# Patient Record
Sex: MALE | Race: WHITE | URBAN - METROPOLITAN AREA
[De-identification: names, ages, dates, MRNs, and addresses within clinical notes are randomized per-mention and may not be internally consistent; named-entity substitution may affect disease eponyms.]

---

## 2017-10-18 ENCOUNTER — OFFICE VISIT (OUTPATIENT)
Dept: FAMILY MEDICINE | Facility: CLINIC | Age: 37
End: 2017-10-18
Payer: COMMERCIAL

## 2017-10-18 DIAGNOSIS — H01.004 BLEPHARITIS OF UPPER EYELIDS OF BOTH EYES, UNSPECIFIED TYPE: ICD-10-CM

## 2017-10-18 DIAGNOSIS — H01.001 BLEPHARITIS OF UPPER EYELIDS OF BOTH EYES, UNSPECIFIED TYPE: ICD-10-CM

## 2017-10-18 DIAGNOSIS — I78.1 CAPILLARY HEMANGIOMA: ICD-10-CM

## 2017-10-18 DIAGNOSIS — L21.9 SEBORRHEIC DERMATITIS: Primary | ICD-10-CM

## 2017-10-18 PROCEDURE — 99214 OFFICE O/P EST MOD 30 MIN: CPT | Performed by: FAMILY MEDICINE

## 2017-10-18 RX ORDER — DOXYCYCLINE 50 MG/1
CAPSULE ORAL
Qty: 60 CAPSULE | Refills: 1 | Status: SHIPPED | OUTPATIENT
Start: 2017-10-18 | End: 2017-11-15

## 2017-10-18 RX ORDER — OMEPRAZOLE 40 MG/1
40 CAPSULE, DELAYED RELEASE ORAL
COMMUNITY
Start: 2015-08-15

## 2017-10-18 RX ORDER — KETOCONAZOLE 20 MG/G
CREAM TOPICAL 2 TIMES DAILY
Qty: 30 G | Refills: 1 | Status: SHIPPED | OUTPATIENT
Start: 2017-10-18

## 2017-10-18 NOTE — PROGRESS NOTES
"[facility] - PRIMARY CARE SKIN    CC : Lesion(s)  SUBJECTIVE:                                                    Steven Patricio is a 37 year old male who presents to clinic today because of changing lesions on the face and scalp.    Bothersome lesions noticed by the patient or other skin concerns :  Issue One : He has developed \"oily bumps\" on forehead, worse in the emanuel.  Onset : approximately 6 months.  Enlarging : NO.  Bleeding : NO  Itchy or irritating : YES.  Pain or tenderness : YES.  Changing color : NO.  Issue Two : Lesion on scalp began smaller and has been enlarging in size.  Onset : approximately 1 year.  Enlarging : YES.  Bleeding : NO  Itchy or irritating : NO.  Pain or tenderness : NO.  Changing color : YES.    Issue Three : He also requests advice for effective long-term control of blepharitis, which has been an issue for years. He has been using wipes throughout the day for control. J&J baby shampoo has also been tried in the past. He has not had any topical or oral antibiotics for control of blepharitis.    Personal history of skin cancer : NO.  Family history of skin cancer : YES - skin cancer in grandfather.    Sun Exposure History  Previous history of significant sun exposure:  Blistering sunburns : YES  Tanning beds : NO.  Sunscreen Use : YES.  Sun-protective clothing use : No  Wide-brimmed hats : No  Sunglasses : Yes  Seeks shade : No    Occupation : logistics (indoor).    Refer to electronic medical record (EMR) for past medical history and medications.    INTEGUMENTARY/SKIN: POSITIVE for changing lesions  ROS : 14 point review of systems was negative except the symptoms listed above in the HPI.    This document serves as a record of the services and decisions personally performed and made by Pam Galicia MD. It was created on her behalf by Giovani Hutton, a trained medical scribe.  The creation of this document is based on the scribe's personal observations and the provider's statements " to the medical scribe.  Giovani Hutton, October 18, 2017 3:42 PM      OBJECTIVE:                                                    GENERAL: healthy, alert and no distress  SKIN: Moser Skin Type - I.  Scalp, Face were examined. The dermatoscope was used to help evaluate pigmented lesions.  Skin Pertinent Findings:  Face : Light scaling and slight erythema across eyebrows.    Eyelids : crusting at base of upper eyelids and diffuse erythema on both upper and lower eyelids    Scalp : Scattered light scaling and dandruff.    Right parietal scalp : 3 mm in size erythematous lesion(s) consistent with capillary hemangioma.    Diagnostic Test Results:  none         ASSESSMENT:                                                      Encounter Diagnoses   Name Primary?     Seborrheic dermatitis Yes     Capillary hemangioma      Blepharitis of upper eyelids of both eyes, unspecified type          PLAN:                                                    Patient Instructions   FUTURE APPOINTMENTS  Follow up in 4 weeks for re-evaluation of blepharitis and seborrheic dermatitis.    TOPICAL MEDICATION INSTRUCTIONS  Ketoconazole 2% cream    Apply a thin layer to the affected area(s) two times per day.     Keep in mind to also regularly use moisturizer, as this preventative measure can help maintain your skin's natural moisture barrier.    Apply moisturizer after application of medication.    ORAL ANTIBIOTIC  Take by mouth 1 capsule/tablet of doxycycline 50 mg two time(s) a day for 4 weeks.        PROCEDURES:                                                    None.    TT : 25 minutes.  CT : 15 minutes.      The information in this document, created by the medical scribe for me, accurately reflects the services I personally performed and the decisions made by me. I have reviewed and approved this document for accuracy prior to leaving the patient care area.  Pam Galicia MD October 18, 2017 3:42 PM  [facility] PRIMARY CARE  SKIN

## 2017-10-18 NOTE — PATIENT INSTRUCTIONS
FUTURE APPOINTMENTS  Follow up in 4 weeks for re-evaluation of blepharitis and seborrheic dermatitis.    TOPICAL MEDICATION INSTRUCTIONS  Ketoconazole 2% cream    Apply a thin layer to the affected area(s) two times per day.     Keep in mind to also regularly use moisturizer, as this preventative measure can help maintain your skin's natural moisture barrier.    Apply moisturizer after application of medication.    ORAL ANTIBIOTIC  Take by mouth 1 capsule/tablet of doxycycline 50 mg two time(s) a day for 4 weeks.

## 2017-10-18 NOTE — MR AVS SNAPSHOT
After Visit Summary   10/18/2017    Steven Patricio    MRN: [id number]           Patient Information     Date Of Birth          1980        Visit Information        Provider Department      10/18/2017 3:40 PM Stephenie Galicia MD [facility] Primary Care Skin        Today's Diagnoses     Seborrheic dermatitis    -  1    Capillary hemangioma        Blepharitis of upper eyelids of both eyes, unspecified type          Care Instructions    FUTURE APPOINTMENTS  Follow up in 4 weeks for re-evaluation of blepharitis and seborrheic dermatitis.    TOPICAL MEDICATION INSTRUCTIONS  Ketoconazole 2% cream    Apply a thin layer to the affected area(s) two times per day.     Keep in mind to also regularly use moisturizer, as this preventative measure can help maintain your skin's natural moisture barrier.    Apply moisturizer after application of medication.    ORAL ANTIBIOTIC  Take by mouth 1 capsule/tablet of doxycycline 50 mg two time(s) a day for 4 weeks.          Follow-ups after your visit        Who to contact     If you have questions or need follow up information about today's clinic visit or your schedule please contact [facility] PRIMARY CARE SKIN directly at [phone].  Normal or non-critical lab and imaging results will be communicated to you by Arista Powerhart, letter or phone within 4 business days after the clinic has received the results. If you do not hear from us within 7 days, please contact the clinic through Arista Powerhart or phone. If you have a critical or abnormal lab result, we will notify you by phone as soon as possible.  Submit refill requests through [employer] or call your pharmacy and they will forward the refill request to us. Please allow 3 business days for your refill to be completed.          Additional Information About Your Visit        MyChar[facility] Information     [employer] lets you send messages to your doctor, view your test results, renew your prescriptions, schedule  "appointments and more. To sign up, go to [email or web address] . Click on \"Log in\" on the left side of the screen, which will take you to the Welcome page. Then click on \"Sign up Now\" on the right side of the page.     You will be asked to enter the access code listed below, as well as some personal information. Please follow the directions to create your username and password.     Your access code is: [email or web address]  Expires: 2018  3:54 PM     Your access code will  in 90 days. If you need help or a new code, please call your [facility] clinic or [phone].        Care EveryWhere ID     This is your Care EveryWhere ID. This could be used by other organizations to access your [facility] medical records  [id number]         Blood Pressure from Last 3 Encounters:   No data found for BP    Weight from Last 3 Encounters:   No data found for Wt              Today, you had the following     No orders found for display         Today's Medication Changes          These changes are accurate as of: 10/18/17  3:54 PM.  If you have any questions, ask your nurse or doctor.               Start taking these medicines.        Dose/Directions    doxycycline monohydrate 50 MG capsule   Used for:  Blepharitis of upper eyelids of both eyes, unspecified type   Started by:  Stephenie Galicia MD        50 mg po bid for 4 weeks   Quantity:  60 capsule   Refills:  1       ketoconazole 2 % cream   Commonly known as:  NIZORAL   Used for:  Seborrheic dermatitis, Capillary hemangioma   Started by:  Stephenie Galicia MD        Apply topically 2 times daily   Quantity:  30 g   Refills:  1            Where to get your medicines      These medications were sent to [facility] [id number] [place] [address] [address] [facility] [address]  [address] [place] [address] [address], [facility] [address]     Phone:  [phone]     doxycycline monohydrate 50 MG capsule    ketoconazole 2 % cream                Primary Care Provider " Fax #    Provider Not In System [phone]                Equal Access to Services     JYOTIJAYME TIFFANI : Hadii estelle Sanon, liza torres, burkerioc singhmanicolas storylucilamirza hager. So [facility] [phone].    ATENCIÓN: Si habla español, tiene a welch disposición servicios gratuitos de asistencia lingüística. Llame al [phone].    We comply with applicable federal civil rights laws and [place] laws. We do not discriminate on the basis of race, color, national origin, age, disability, sex, sexual orientation, or gender identity.            Thank you!     Thank you for choosing [facility] - PRIMARY CARE SKIN  for your care. Our goal is always to provide you with excellent care. Hearing back from our patients is one way we can continue to improve our services. Please take a few minutes to complete the written survey that you may receive in the mail after your visit with us. Thank you!             Your Updated Medication List - Protect others around you: Learn how to safely use, store and throw away your medicines at www.disposemymeds.org.          This list is accurate as of: 10/18/17  3:54 PM.  Always use your most recent med list.                   Brand Name Dispense Instructions for use Diagnosis    doxycycline monohydrate 50 MG capsule     60 capsule    50 mg po bid for 4 weeks    Blepharitis of upper eyelids of both eyes, unspecified type       ketoconazole 2 % cream    NIZORAL    30 g    Apply topically 2 times daily    Seborrheic dermatitis, Capillary hemangioma       omeprazole 40 MG capsule    priLOSEC     Take 40 mg by mouth

## 2017-10-18 NOTE — LETTER
"    10/18/2017         RE: Steven Patricio  [address]  [place] [address]        Dear Colleague,    Thank you for referring your patient, Steven Patricio, to the [facility] - PRIMARY CARE SKIN. Please see a copy of my visit note below.    [facility] PRIMARY CARE SKIN    CC : Lesion(s)  SUBJECTIVE:                                                    Steven Patricio is a 37 year old male who presents to clinic today because of changing lesions on the face and scalp.    Bothersome lesions noticed by the patient or other skin concerns :  Issue One : He has developed \"oily bumps\" on forehead, worse in the emanuel.  Onset : approximately 6 months.  Enlarging : NO.  Bleeding : NO  Itchy or irritating : YES.  Pain or tenderness : YES.  Changing color : NO.  Issue Two : Lesion on scalp began smaller and has been enlarging in size.  Onset : approximately 1 year.  Enlarging : YES.  Bleeding : NO  Itchy or irritating : NO.  Pain or tenderness : NO.  Changing color : YES.    Issue Three : He also requests advice for effective long-term control of blepharitis, which has been an issue for years. He has been using wipes throughout the day for control. J&J baby shampoo has also been tried in the past. He has not had any topical or oral antibiotics for control of blepharitis.    Personal history of skin cancer : NO.  Family history of skin cancer : YES - skin cancer in grandfather.    Sun Exposure History  Previous history of significant sun exposure:  Blistering sunburns : YES  Tanning beds : NO.  Sunscreen Use : YES.  Sun-protective clothing use : No  Wide-brimmed hats : No  Sunglasses : Yes  Seeks shade : No    Occupation : logistics (indoor).    Refer to electronic medical record (EMR) for past medical history and medications.    INTEGUMENTARY/SKIN: POSITIVE for changing lesions  ROS : 14 point review of systems was negative except the symptoms listed above in the HPI.    This document serves as a record of the services " and decisions personally performed and made by Pam Galicia MD. It was created on her behalf by Giovani Hutton, a trained medical scribe.  The creation of this document is based on the scribe's personal observations and the provider's statements to the medical scribe.  Giovani Hutton, October 18, 2017 3:42 PM      OBJECTIVE:                                                    GENERAL: healthy, alert and no distress  SKIN: Moser Skin Type - I.  Scalp, Face were examined. The dermatoscope was used to help evaluate pigmented lesions.  Skin Pertinent Findings:  Face : Light scaling and slight erythema across eyebrows.    Eyelids : crusting at base of upper eyelids and diffuse erythema on both upper and lower eyelids    Scalp : Scattered light scaling and dandruff.    Right parietal scalp : 3 mm in size erythematous lesion(s) consistent with capillary hemangioma.    Diagnostic Test Results:  none         ASSESSMENT:                                                      Encounter Diagnoses   Name Primary?     Seborrheic dermatitis Yes     Capillary hemangioma      Blepharitis of upper eyelids of both eyes, unspecified type          PLAN:                                                    Patient Instructions   FUTURE APPOINTMENTS  Follow up in 4 weeks for re-evaluation of blepharitis and seborrheic dermatitis.    TOPICAL MEDICATION INSTRUCTIONS  Ketoconazole 2% cream    Apply a thin layer to the affected area(s) two times per day.     Keep in mind to also regularly use moisturizer, as this preventative measure can help maintain your skin's natural moisture barrier.    Apply moisturizer after application of medication.    ORAL ANTIBIOTIC  Take by mouth 1 capsule/tablet of doxycycline 50 mg two time(s) a day for 4 weeks.        PROCEDURES:                                                    None.    TT : 25 minutes.  CT : 15 minutes.      The information in this document, created by the medical scribe for me, accurately  reflects the services I personally performed and the decisions made by me. I have reviewed and approved this document for accuracy prior to leaving the patient care area.  Pam Galicia MD October 18, 2017 3:42 PM  [facility] - PRIMARY CARE SKIN    Again, thank you for allowing me to participate in the care of your patient.        Sincerely,        Stephenie Galicia MD

## 2017-11-15 ENCOUNTER — OFFICE VISIT (OUTPATIENT)
Dept: FAMILY MEDICINE | Facility: CLINIC | Age: 37
End: 2017-11-15
Payer: COMMERCIAL

## 2017-11-15 DIAGNOSIS — L71.8 OCULAR ROSACEA: ICD-10-CM

## 2017-11-15 DIAGNOSIS — H01.004 BLEPHARITIS OF UPPER EYELIDS OF BOTH EYES, UNSPECIFIED TYPE: ICD-10-CM

## 2017-11-15 DIAGNOSIS — L21.9 SEBORRHEIC DERMATITIS: Primary | ICD-10-CM

## 2017-11-15 DIAGNOSIS — H01.001 BLEPHARITIS OF UPPER EYELIDS OF BOTH EYES, UNSPECIFIED TYPE: ICD-10-CM

## 2017-11-15 PROCEDURE — 99213 OFFICE O/P EST LOW 20 MIN: CPT | Performed by: FAMILY MEDICINE

## 2017-11-15 RX ORDER — DOXYCYCLINE 50 MG/1
CAPSULE ORAL
Qty: 60 CAPSULE | Refills: 3 | Status: SHIPPED | OUTPATIENT
Start: 2017-11-15 | End: 2018-01-25

## 2017-11-15 NOTE — MR AVS SNAPSHOT
After Visit Summary   11/15/2017    Steven Patricio    MRN: [id number]           Patient Information     Date Of Birth          1980        Visit Information        Provider Department      11/15/2017 4:20 PM Stephenie Galicia MD [facility] - Primary Care Skin        Today's Diagnoses     Seborrheic dermatitis    -  1    Blepharitis of upper and lower eyelids of both eyes        Blepharitis of upper eyelids of both eyes, unspecified type        Ocular rosacea          Care Instructions    FUTURE APPOINTMENTS  Follow up in 3 month(s)    ORAL ANTIBIOTIC  Take by mouth 1 capsule/tablet of doxycycline 50 mg two time(s) a day for 1 more month.  After 1 month, decrease to 1 tablet once daily.    TETRACYCLINE ANTIBIOTIC INFORMATION  Minocycline and doxycycline are tetracycline antibiotics and can increase your sun sensitivity, so make sure to be vigilant in regularly applying sunscreen. Also, avoid taking these with dairy products, as calcium can bind the antibiotic, making it unavailable to your body.     TOPICAL CREAM  Continue applying ketoconazole 2% cream as needed for control of symptoms on the face.    Avoid excessive alcohol, caffeine, chocolate, spicy food consumption or sun exposure for control of rosacea.          Follow-ups after your visit        Who to contact     If you have questions or need follow up information about today's clinic visit or your schedule please contact [facility] - PRIMARY CARE SKIN directly at [phone].  Normal or non-critical lab and imaging results will be communicated to you by MyChart, letter or phone within 4 business days after the clinic has received the results. If you do not hear from us within 7 days, please contact the clinic through MyChart or phone. If you have a critical or abnormal lab result, we will notify you by phone as soon as possible.  Submit refill requests through [employer] or call your pharmacy and they will forward the  refill request to us. Please allow 3 business days for your refill to be completed.          Additional Information About Your Visit        MyChart Information     [employer] gives you secure access to your electronic health record. If you see a primary care provider, you can also send messages to your care team and make appointments. If you have questions, please call your primary care clinic.  If you do not have a primary care provider, please call [phone] and they will assist you.        Care EveryWhere ID     This is your Care EveryWhere ID. This could be used by other organizations to access your [facility] medical records  [id number]         Blood Pressure from Last 3 Encounters:   No data found for BP    Weight from Last 3 Encounters:   No data found for Wt              Today, you had the following     No orders found for display         Today's Medication Changes          These changes are accurate as of: 11/15/17  4:31 PM.  If you have any questions, ask your nurse or doctor.               These medicines have changed or have updated prescriptions.        Dose/Directions    doxycycline monohydrate 50 MG capsule   This may have changed:  additional instructions   Used for:  Blepharitis of upper eyelids of both eyes, unspecified type   Changed by:  Stephenie Galicia MD        50 mg po q day   Quantity:  60 capsule   Refills:  3            Where to get your medicines      These medications were sent to [facility] [address] [place] [address] 25  AT [facility] [address] [facility] [address]  [address] [place] [address] 25 [facility] [address]     Phone:  [phone]     doxycycline monohydrate 50 MG capsule                Primary Care Provider Fax #    Provider Not In System [phone]                Equal Access to Services     [facility] AH: Jackie sanchezo Solisa, waaxda luqadaha, qaybta kaalmada jd, nicolas hager. So [facility] [phone].    ATENCIÓN: Onur kemp  español, tiene a welch disposición servicios gratuitos de asistencia lingüística. Radha ward [phone].    We comply with applicable federal civil rights laws and [place] laws. We do not discriminate on the basis of race, color, national origin, age, disability, sex, sexual orientation, or gender identity.            Thank you!     Thank you for choosing [facility] - PRIMARY CARE SKIN  for your care. Our goal is always to provide you with excellent care. Hearing back from our patients is one way we can continue to improve our services. Please take a few minutes to complete the written survey that you may receive in the mail after your visit with us. Thank you!             Your Updated Medication List - Protect others around you: Learn how to safely use, store and throw away your medicines at www.disposemymeds.org.          This list is accurate as of: 11/15/17  4:31 PM.  Always use your most recent med list.                   Brand Name Dispense Instructions for use Diagnosis    doxycycline monohydrate 50 MG capsule     60 capsule    50 mg po q day    Blepharitis of upper eyelids of both eyes, unspecified type       ketoconazole 2 % cream    NIZORAL    30 g    Apply topically 2 times daily    Seborrheic dermatitis, Capillary hemangioma       omeprazole 40 MG capsule    priLOSEC     Take 40 mg by mouth

## 2017-11-15 NOTE — PATIENT INSTRUCTIONS
FUTURE APPOINTMENTS  Follow up in 3 month(s)    ORAL ANTIBIOTIC  Take by mouth 1 capsule/tablet of doxycycline 50 mg two time(s) a day for 1 more month.  After 1 month, decrease to 1 tablet once daily.    TETRACYCLINE ANTIBIOTIC INFORMATION  Minocycline and doxycycline are tetracycline antibiotics and can increase your sun sensitivity, so make sure to be vigilant in regularly applying sunscreen. Also, avoid taking these with dairy products, as calcium can bind the antibiotic, making it unavailable to your body.     TOPICAL CREAM  Continue applying ketoconazole 2% cream as needed for control of symptoms on the face.    Avoid excessive alcohol, caffeine, chocolate, spicy food consumption or sun exposure for control of rosacea.

## 2017-11-15 NOTE — PROGRESS NOTES
[facility] - PRIMARY CARE SKIN    CC : Lesion(s)  SUBJECTIVE:                                                    Steven Patricio is a 37 year old male who presents to clinic today for follow-up of seborrheic dermatitis and blepharitis. He reports a chronic history of dryness and irritation of eyes; eyes always appear to be irritated at the end of the day. However, he continues to wipe his eyes at the end of the day due to watering.    Current treatment : ketoconazole 2% cream on face, doxycycline 50 mg BID  Response to treatment : Symptoms have become controlled on the eyelids and on the forehead.  Side effects noted : None    Aggravating factors : He has noticed that symptoms are worse after drinking alcohol or caffeine.    Issue Two : Two red marks on the left side of the face were noticed upon waking this morning.    Personal history of skin cancer : NO.  Family history of skin cancer : YES - skin cancer in grandfather.    Sun Exposure History  Previous history of significant sun exposure:  Blistering sunburns : YES  Tanning beds : NO.  Sunscreen Use : YES.  Sun-protective clothing use : No  Wide-brimmed hats : No  Sunglasses : Yes  Seeks shade : No    Occupation : logistics (indoor).    Refer to electronic medical record (EMR) for past medical history and medications.    INTEGUMENTARY/SKIN: NEGATIVE for rash  ROS : 14 point review of systems was negative except the symptoms listed above in the HPI.    This document serves as a record of the services and decisions personally performed and made by Pam Galicia MD. It was created on her behalf by Giovani Hutton, a trained medical scribe.  The creation of this document is based on the scribe's personal observations and the provider's statements to the medical scribe.  Giovani Hutton, November 15, 2017 4:20 PM      OBJECTIVE:                                                    GENERAL: healthy, alert and no distress  SKIN: Moser Skin Type - I.  Scalp, Face were  examined. The dermatoscope was used to help evaluate pigmented lesions.  Skin Pertinent Findings:  Left lateral face : Two linear areas of erythema and superficial excoriation(s)     Eyelids : No erythema on upper eyelids. No crusting.    Diagnostic Test Results:  none     MDM : Due to resolution of symptoms with doxycycline, consider ocular rosacea in differential diagnoses.      ASSESSMENT:                                                      Encounter Diagnoses   Name Primary?     Seborrheic dermatitis Yes     Blepharitis of upper and lower eyelids of both eyes      Blepharitis of upper eyelids of both eyes, unspecified type      Ocular rosacea          PLAN:                                                    Patient Instructions   FUTURE APPOINTMENTS  Follow up in 2 month(s) as needed.    ORAL ANTIBIOTIC  Take by mouth 1 capsule/tablet of doxycycline 50 mg two time(s) a day for 1 more month.  After 1 month, decrease to 1 tablet once daily.    TETRACYCLINE ANTIBIOTIC INFORMATION  Minocycline and doxycycline are tetracycline antibiotics and can increase your sun sensitivity, so make sure to be vigilant in regularly applying sunscreen. Also, avoid taking these with dairy products, as calcium can bind the antibiotic, making it unavailable to your body.     TOPICAL CREAM  Continue applying ketoconazole 2% cream as needed for control of symptoms on the face.    Avoid excessive alcohol, caffeine, chocolate, spicy food consumption or sun exposure for control of rosacea.        PROCEDURES:                                                    None.    TT : 20 minutes.  CT : 15 minutes.      The information in this document, created by the medical scribe for me, accurately reflects the services I personally performed and the decisions made by me. I have reviewed and approved this document for accuracy prior to leaving the patient care area.  Pam Galicia MD November 15, 2017 4:18 PM  [facility] - PRIMARY CARE SKIN

## 2017-11-15 NOTE — LETTER
11/15/2017         RE: Steven Patricio  [address]  [place] [facility] [address]        Dear Colleague,    Thank you for referring your patient, Steven Patricio, to the [facility] PRIMARY [facility] SKIN. Please see a copy of my visit note below.    [facility] PRIMARY CARE SKIN    CC : Lesion(s)  SUBJECTIVE:                                                    Steven Patricio is a 37 year old male who presents to clinic today for follow-up of seborrheic dermatitis and blepharitis. He reports a chronic history of dryness and irritation of eyes; eyes always appear to be irritated at the end of the day. However, he continues to wipe his eyes at the end of the day due to watering.    Current treatment : ketoconazole 2% cream on face, doxycycline 50 mg BID  Response to treatment : Symptoms have become controlled on the eyelids and on the forehead.  Side effects noted : None    Aggravating factors : He has noticed that symptoms are worse after drinking alcohol or caffeine.    Issue Two : Two red marks on the left side of the face were noticed upon waking this morning.    Personal history of skin cancer : NO.  Family history of skin cancer : YES - skin cancer in grandfather.    Sun Exposure History  Previous history of significant sun exposure:  Blistering sunburns : YES  Tanning beds : NO.  Sunscreen Use : YES.  Sun-protective clothing use : No  Wide-brimmed hats : No  Sunglasses : Yes  Seeks shade : No    Occupation : logistics (indoor).    Refer to electronic medical record (EMR) for past medical history and medications.    INTEGUMENTARY/SKIN: NEGATIVE for rash  ROS : 14 point review of systems was negative except the symptoms listed above in the HPI.    This document serves as a record of the services and decisions personally performed and made by Pam Galicia MD. It was created on her behalf by Giovani Hutton, a trained medical scribe.  The creation of this document is based on the scribe's personal observations and the  provider's statements to the medical scribe.  Giovani Hutton, November 15, 2017 4:20 PM      OBJECTIVE:                                                    GENERAL: healthy, alert and no distress  SKIN: Moser Skin Type - I.  Scalp, Face were examined. The dermatoscope was used to help evaluate pigmented lesions.  Skin Pertinent Findings:  Left lateral face : Two linear areas of erythema and superficial excoriation(s)     Eyelids : No erythema on upper eyelids. No crusting.    Diagnostic Test Results:  none     MDM : Due to resolution of symptoms with doxycycline, consider ocular rosacea in differential diagnoses.      ASSESSMENT:                                                      Encounter Diagnoses   Name Primary?     Seborrheic dermatitis Yes     Blepharitis of upper and lower eyelids of both eyes      Blepharitis of upper eyelids of both eyes, unspecified type      Ocular rosacea          PLAN:                                                    Patient Instructions   FUTURE APPOINTMENTS  Follow up in 2 month(s) as needed.    ORAL ANTIBIOTIC  Take by mouth 1 capsule/tablet of doxycycline 50 mg two time(s) a day for 1 more month.  After 1 month, decrease to 1 tablet once daily.    TETRACYCLINE ANTIBIOTIC INFORMATION  Minocycline and doxycycline are tetracycline antibiotics and can increase your sun sensitivity, so make sure to be vigilant in regularly applying sunscreen. Also, avoid taking these with dairy products, as calcium can bind the antibiotic, making it unavailable to your body.     TOPICAL CREAM  Continue applying ketoconazole 2% cream as needed for control of symptoms on the face.    Avoid excessive alcohol, caffeine, chocolate, spicy food consumption or sun exposure for control of rosacea.        PROCEDURES:                                                    None.    TT : 20 minutes.  CT : 15 minutes.      The information in this document, created by the medical scribe for me, accurately reflects the  services I personally performed and the decisions made by me. I have reviewed and approved this document for accuracy prior to leaving the patient care area.  Pam Galicia MD November 15, 2017 4:18 PM  [facility] - PRIMARY CARE SKIN    Again, thank you for allowing me to participate in the care of your patient.        Sincerely,        Stephenie Galicia MD

## 2018-01-12 ENCOUNTER — NURSE TRIAGE (OUTPATIENT)
Dept: NURSING | Facility: CLINIC | Age: 38
End: 2018-01-12

## 2018-01-12 ENCOUNTER — TELEPHONE (OUTPATIENT)
Dept: NURSING | Facility: CLINIC | Age: 38
End: 2018-01-12

## 2018-01-12 NOTE — TELEPHONE ENCOUNTER
patient has a refill at the pharmacy- per pharmacist- they have it ready for .    Called patient and notified.    Lynda BRADEN RN  [facility] Skin [facility]  [phone]

## 2018-01-12 NOTE — TELEPHONE ENCOUNTER
Patient needs a refill of doxycycline. Please call patient.  Mary Kramer RN-[facility]

## 2018-01-12 NOTE — TELEPHONE ENCOUNTER
Epic encounter sent to [facility] skin clinic pool and transferred call to clinic.  Mary Kramer RN-[facility]

## 2018-01-24 ENCOUNTER — TELEPHONE (OUTPATIENT)
Dept: FAMILY MEDICINE | Facility: CLINIC | Age: 38
End: 2018-01-24

## 2018-01-24 DIAGNOSIS — H01.001 BLEPHARITIS OF UPPER EYELIDS OF BOTH EYES, UNSPECIFIED TYPE: ICD-10-CM

## 2018-01-24 DIAGNOSIS — H01.004 BLEPHARITIS OF UPPER EYELIDS OF BOTH EYES, UNSPECIFIED TYPE: ICD-10-CM

## 2018-01-24 NOTE — TELEPHONE ENCOUNTER
Patient calling re: doxycycline, can he up the dosage to 2 tablets again? The 1 tablet is not working for him. Please advise.  [phone] (home)   Thank you  Pilar Goff

## 2018-01-25 RX ORDER — DOXYCYCLINE 50 MG/1
CAPSULE ORAL
Qty: 60 CAPSULE | Refills: 3 | Status: SHIPPED | OUTPATIENT
Start: 2018-01-25

## 2018-02-14 ENCOUNTER — OFFICE VISIT (OUTPATIENT)
Dept: FAMILY MEDICINE | Facility: CLINIC | Age: 38
End: 2018-02-14
Payer: COMMERCIAL

## 2018-02-14 DIAGNOSIS — H01.003 BLEPHARITIS OF BOTH EYES WITH ROSACEA: ICD-10-CM

## 2018-02-14 DIAGNOSIS — H01.006 BLEPHARITIS OF BOTH EYES WITH ROSACEA: ICD-10-CM

## 2018-02-14 DIAGNOSIS — L21.9 SEBORRHEIC DERMATITIS: Primary | ICD-10-CM

## 2018-02-14 DIAGNOSIS — L71.9 BLEPHARITIS OF BOTH EYES WITH ROSACEA: ICD-10-CM

## 2018-02-14 PROCEDURE — 99213 OFFICE O/P EST LOW 20 MIN: CPT | Performed by: FAMILY MEDICINE

## 2018-02-14 RX ORDER — TACROLIMUS 1 MG/G
OINTMENT TOPICAL
Qty: 30 G | Refills: 1 | Status: SHIPPED | OUTPATIENT
Start: 2018-02-14

## 2018-02-14 NOTE — LETTER
2/14/2018         RE: Steven Patricio  [address]  [place] [address]        Dear Colleague,    Thank you for referring your patient, Steven Patricio, to the [facility]. Please see a copy of my visit note below.    [facility] - PRIMARY CARE SKIN    CC : rash  SUBJECTIVE:                                                    Steven Patricio is a 37 year old male who presents to clinic today for follow-up of rash on face and eyelid irritation.    Current treatment : doxycycline 50 mg BID, ketoconazole 2% cream. He's also continued to use medicated wipes.  Response to treatment : doxycycline once daily did not provide effective enough control of eyelid irritation symptoms. He does report that eyelid symptoms are improved on current treatment but never fully resolving. Eyelids are almost always itchy.   Ketoconazole 2% cream has provided effective relief of symptoms on the forehead; symptoms recur whenever it is not used.  Side effects noted : None     Aggravating factors : He has noticed that symptoms are worse after drinking alcohol or caffeine.    Personal history of skin cancer : NO.  Family history of skin cancer : YES - skin cancer in grandfather.    Sun Exposure History  Previous history of significant sun exposure:  Blistering sunburns : YES  Tanning beds : NO.  Sunscreen Use : YES.  Sun-protective clothing use : No  Wide-brimmed hats : No  Sunglasses : Yes  Seeks shade : No    Occupation : logistics (indoor).    Refer to electronic medical record (EMR) for past medical history and medications.    INTEGUMENTARY/SKIN: POSITIVE for rash and pruritus  ROS : 14 point review of systems was negative except the symptoms listed above in the HPI.    This document serves as a record of the services and decisions personally performed and made by Pam Galicia MD. It was created on her behalf by Giovani Hutton, a trained medical scribe.  The creation of this document is based on the scribe's personal  observations and the provider's statements to the medical scribe.  Giovani Hutton, February 14, 2018 4:12 PM      OBJECTIVE:                                                    GENERAL: healthy, alert and no distress  SKIN: Moser Skin Type - I.  Scalp, Face were examined. The dermatoscope was used to help evaluate pigmented lesions.  Skin Pertinent Findings:  Face : Erythematous macular eruption underneath the eyes  Eyelids : Erythema extends to upper edge of eyelid. Injection on inner eyelid. Medial erythema.  No scaling on upper eyelids.    MDM : seborrheic dermatitis , blepharitis, ? Component of ocular rosacea      ASSESSMENT:                                                      Encounter Diagnoses   Name Primary?     Seborrheic dermatitis Yes     Blepharitis of both eyes with rosacea          PLAN:                                                    Patient Instructions   FUTURE APPOINTMENTS  Follow up in 4 week(s).    TOPICAL MEDICATION INSTRUCTIONS  Apply OTC hydrocortisone 1% ointment twice daily for 2 days onto eyelids. After 2 days, discontinue hydrocortisone and start tacrolimus (Protopic)    tacrolimus (Protopic) 0.1% ointment    Apply a thin layer to the affected area(s) on the eyelids once per day. Discontinue when symptoms have resolved.    Take care not to get medication into the eye    This is not a steroid, and it can be used on the face.    Keep in mind to also regularly use moisturizer, as this preventative measure can help maintain your skin's natural moisture barrier.    Apply moisturizer after application of medication.    Your health insurance may need a prior authorization for coverage of this medication.    Although there is a black box warning for cancer risk from this medication, the study involved oral consumption by monkeys of this medication at 30 times the maximum recommended dosage.    Ketoconazole 2% cream  Continue using on the forehead only.    ORAL ANTIBIOTIC  Take by mouth 1  capsule/tablet of doxycycline 50 mg two time(s) a day.        PROCEDURES:                                                    None.    TT : 20 minutes.  CT : 15 minutes.      The information in this document, created by the medical scribe for me, accurately reflects the services I personally performed and the decisions made by me. I have reviewed and approved this document for accuracy prior to leaving the patient care area.  Pam Galicia MD February 14, 2018 4:12 PM  [facility] - PRIMARY CARE SKIN    Again, thank you for allowing me to participate in the care of your patient.        Sincerely,        Stephenie Galicia MD

## 2018-02-14 NOTE — PATIENT INSTRUCTIONS
FUTURE APPOINTMENTS  Follow up in 4 week(s).    TOPICAL MEDICATION INSTRUCTIONS  Apply OTC hydrocortisone 1% ointment twice daily for 2 days onto eyelids. After 2 days, discontinue hydrocortisone and start tacrolimus (Protopic)    tacrolimus (Protopic) 0.1% ointment    Apply a thin layer to the affected area(s) on the eyelids once per day. Discontinue when symptoms have resolved.    Take care not to get medication into the eye    This is not a steroid, and it can be used on the face.    Keep in mind to also regularly use moisturizer, as this preventative measure can help maintain your skin's natural moisture barrier.    Apply moisturizer after application of medication.    Your health insurance may need a prior authorization for coverage of this medication.    Although there is a black box warning for cancer risk from this medication, the study involved oral consumption by monkeys of this medication at 30 times the maximum recommended dosage.    Ketoconazole 2% cream  Continue using on the forehead only.    ORAL ANTIBIOTIC  Take by mouth 1 capsule/tablet of doxycycline 50 mg two time(s) a day.

## 2018-02-14 NOTE — PROGRESS NOTES
[facility] - PRIMARY CARE SKIN    CC : rash  SUBJECTIVE:                                                    Steven Patricio is a 37 year old male who presents to clinic today for follow-up of rash on face and eyelid irritation.    Current treatment : doxycycline 50 mg BID, ketoconazole 2% cream. He's also continued to use medicated wipes.  Response to treatment : doxycycline once daily did not provide effective enough control of eyelid irritation symptoms. He does report that eyelid symptoms are improved on current treatment but never fully resolving. Eyelids are almost always itchy.   Ketoconazole 2% cream has provided effective relief of symptoms on the forehead; symptoms recur whenever it is not used.  Side effects noted : None     Aggravating factors : He has noticed that symptoms are worse after drinking alcohol or caffeine.    Personal history of skin cancer : NO.  Family history of skin cancer : YES - skin cancer in grandfather.    Sun Exposure History  Previous history of significant sun exposure:  Blistering sunburns : YES  Tanning beds : NO.  Sunscreen Use : YES.  Sun-protective clothing use : No  Wide-brimmed hats : No  Sunglasses : Yes  Seeks shade : No    Occupation : logistics (indoor).    Refer to electronic medical record (EMR) for past medical history and medications.    INTEGUMENTARY/SKIN: POSITIVE for rash and pruritus  ROS : 14 point review of systems was negative except the symptoms listed above in the HPI.    This document serves as a record of the services and decisions personally performed and made by Pam Galicia MD. It was created on her behalf by Giovani Hutton, a trained medical scribe.  The creation of this document is based on the scribe's personal observations and the provider's statements to the medical scribe.  Giovani Hutton, February 14, 2018 4:12 PM      OBJECTIVE:                                                    GENERAL: healthy, alert and no distress  SKIN: Moser Skin Type -  I.  Scalp, Face were examined. The dermatoscope was used to help evaluate pigmented lesions.  Skin Pertinent Findings:  Face : Erythematous macular eruption underneath the eyes  Eyelids : Erythema extends to upper edge of eyelid. Injection on inner eyelid. Medial erythema.  No scaling on upper eyelids.    MDM : seborrheic dermatitis , blepharitis, ? Component of ocular rosacea      ASSESSMENT:                                                      Encounter Diagnoses   Name Primary?     Seborrheic dermatitis Yes     Blepharitis of both eyes with rosacea          PLAN:                                                    Patient Instructions   FUTURE APPOINTMENTS  Follow up in 4 week(s).    TOPICAL MEDICATION INSTRUCTIONS  Apply OTC hydrocortisone 1% ointment twice daily for 2 days onto eyelids. After 2 days, discontinue hydrocortisone and start tacrolimus (Protopic)    tacrolimus (Protopic) 0.1% ointment    Apply a thin layer to the affected area(s) on the eyelids once per day. Discontinue when symptoms have resolved.    Take care not to get medication into the eye    This is not a steroid, and it can be used on the face.    Keep in mind to also regularly use moisturizer, as this preventative measure can help maintain your skin's natural moisture barrier.    Apply moisturizer after application of medication.    Your health insurance may need a prior authorization for coverage of this medication.    Although there is a black box warning for cancer risk from this medication, the study involved oral consumption by monkeys of this medication at 30 times the maximum recommended dosage.    Ketoconazole 2% cream  Continue using on the forehead only.    ORAL ANTIBIOTIC  Take by mouth 1 capsule/tablet of doxycycline 50 mg two time(s) a day.        PROCEDURES:                                                    None.    TT : 20 minutes.  CT : 15 minutes.      The information in this document, created by the medical scribe for me,  accurately reflects the services I personally performed and the decisions made by me. I have reviewed and approved this document for accuracy prior to leaving the patient care area.  Pam Galicia MD February 14, 2018 4:12 PM  [facility] - PRIMARY CARE SKIN

## 2018-02-14 NOTE — MR AVS SNAPSHOT
After Visit Summary   2/14/2018    Steven Patricio    MRN: [id number]           Patient Information     Date Of Birth          1980        Visit Information        Provider Department      2/14/2018 4:20 PM Stephenie Galicia MD [facility] [place]        Today's Diagnoses     Seborrheic dermatitis    -  1      Care Instructions    FUTURE APPOINTMENTS  Follow up in 4 week(s).    TOPICAL MEDICATION INSTRUCTIONS  Apply OTC hydrocortisone 1% ointment twice daily for 2 days onto eyelids. After 2 days, discontinue hydrocortisone and start tacrolimus (Protopic)    tacrolimus (Protopic) 0.1% ointment    Apply a thin layer to the affected area(s) on the eyelids once per day. Discontinue when symptoms have resolved.    Take care not to get medication into the eye    This is not a steroid, and it can be used on the face.    Keep in mind to also regularly use moisturizer, as this preventative measure can help maintain your skin's natural moisture barrier.    Apply moisturizer after application of medication.    Your health insurance may need a prior authorization for coverage of this medication.    Although there is a black box warning for cancer risk from this medication, the study involved oral consumption by monkeys of this medication at 30 times the maximum recommended dosage.    Ketoconazole 2% cream  Continue using on the forehead only.    ORAL ANTIBIOTIC  Take by mouth 1 capsule/tablet of doxycycline 50 mg two time(s) a day.          Follow-ups after your visit        Who to contact     If you have questions or need follow up information about today's clinic visit or your schedule please contact [facility] [place] directly at [phone].  Normal or non-critical lab and imaging results will be communicated to you by MyChart, letter or phone within 4 business days after the clinic has received the results. If you do not hear from us within 7 days, please contact the clinic  through kSARIA or phone. If you have a critical or abnormal lab result, we will notify you by phone as soon as possible.  Submit refill requests through kSARIA or call your pharmacy and they will forward the refill request to us. Please allow 3 business days for your refill to be completed.          Additional Information About Your Visit        [employer] Information     k[employer]ARIA gives you secure access to your electronic health record. If you see a primary care provider, you can also send messages to your care team and make appointments. If you have questions, please call your primary care clinic.  If you do not have a primary care provider, please call [phone] and they will assist you.        Care EveryWhere ID     This is your Care EveryWhere ID. This could be used by other organizations to access your [place] medical records  [id number]         Blood Pressure from Last 3 Encounters:   No data found for BP    Weight from Last 3 Encounters:   No data found for Wt              Today, you had the following     No orders found for display         Today's Medication Changes          These changes are accurate as of 2/14/18  4:25 PM.  If you have any questions, ask your nurse or doctor.               Start taking these medicines.        Dose/Directions    tacrolimus 0.1 % ointment   Commonly known as:  PROTOPIC   Used for:  Seborrheic dermatitis   Started by:  Stephenie Galicia MD        Apply to eyelids bid   Quantity:  30 g   Refills:  1            Where to get your medicines      These medications were sent to [facility] [address] [place] [address] [address]  [address], [facility] [address]     Phone:  [phone]     tacrolimus 0.1 % ointment                Primary Care Provider Fax #    Provider Not In System [phone]                Equal Access to Services     MASOOD NIXON AH: liza Srivastava qaybta kaalmada  nicolas milesuna kwametere marianoaan ah. So [facility] [phone].    ATENCIÓN: Si viridiana simon, tiene a welch disposición servicios gratuitos de asistencia lingüística. Radha al [phone].    We comply with applicable federal civil rights laws and [place] laws. We do not discriminate on the basis of race, color, national origin, age, disability, sex, sexual orientation, or gender identity.            Thank you!     [facility]. Our goal is always to provide you with excellent care. Hearing back from our patients is one way we can continue to improve our services. Please take a few minutes to complete the written survey that you may receive in the mail after your visit with us. Thank you!             Your Updated Medication List - Protect others around you: Learn how to safely use, store and throw away your medicines at www.disposemymeds.org.          This list is accurate as of 2/14/18  4:25 PM.  Always use your most recent med list.                   Brand Name Dispense Instructions for use Diagnosis    doxycycline monohydrate 50 MG capsule     60 capsule    1-2 tabs po q day    Blepharitis of upper eyelids of both eyes, unspecified type       ketoconazole 2 % cream    NIZORAL    30 g    Apply topically 2 times daily    Seborrheic dermatitis, Capillary hemangioma       omeprazole 40 MG capsule    priLOSEC     Take 40 mg by mouth        tacrolimus 0.1 % ointment    PROTOPIC    30 g    Apply to eyelids bid    Seborrheic dermatitis

## 2018-02-26 ENCOUNTER — TELEPHONE (OUTPATIENT)
Dept: FAMILY MEDICINE | Facility: CLINIC | Age: 38
End: 2018-02-26

## 2018-02-26 NOTE — TELEPHONE ENCOUNTER
Prior auth form completed for tacrolimus- faxed to [facility] at [phone]    Lynda BRADEN RN  [facility]  [phone]  [facility]   [phone]

## 2018-03-02 NOTE — TELEPHONE ENCOUNTER
refaxed form to [facility] asking what the results are.    Lynda BRADEN RN  [facility]  [phone]  [facility]   [phone]

## 2018-03-08 NOTE — TELEPHONE ENCOUNTER
Shantelle chavez- PA is still not active- [facility] [phone]  Prior Authorization Retail Medication Request    Medication/Dose: tacrolimus (PROTOPIC) 0.1 % ointment Apply to eyelids bid  ICD code (if different than what is on RX):  Previously Tried and Failed:Current treatment : doxycycline 50 mg BID, ketoconazole 2% cream. He's also continued to use medicated wipes.  Response to treatment : doxycycline once daily did not provide effective enough control of eyelid irritation symptoms. He does report that eyelid symptoms are improved on current treatment but never fully resolving. Eyelids are almost always itchy.   Ketoconazole 2% cream has provided effective relief of symptoms on the forehead; symptoms recur whenever it is not used.  Rationale:    Insurance Name: [employer]   Insurance ID: [id number]       Pharmacy Information (if different than what is on RX)   Pharmacy      [id number] - [place] [address]

## 2019-01-07 ENCOUNTER — OFFICE VISIT (OUTPATIENT)
Dept: FAMILY MEDICINE | Facility: CLINIC | Age: 39
End: 2019-01-07
Payer: COMMERCIAL

## 2019-01-07 VITALS — SYSTOLIC BLOOD PRESSURE: 122 MMHG | HEART RATE: 76 BPM | OXYGEN SATURATION: 98 % | DIASTOLIC BLOOD PRESSURE: 76 MMHG

## 2019-01-07 DIAGNOSIS — L73.8 SEBACEOUS HYPERPLASIA OF FACE: Primary | ICD-10-CM

## 2019-01-07 PROCEDURE — 99213 OFFICE O/P EST LOW 20 MIN: CPT | Performed by: FAMILY MEDICINE

## 2019-01-07 NOTE — PATIENT INSTRUCTIONS
FUTURE APPOINTMENTS  Follow up as needed if lesions develop increasing tenderness, size, bleeding, discharge, or infection.

## 2019-01-07 NOTE — LETTER
1/7/2019         RE: Steven Patricio  [address]  [facility] [address]        Dear Colleague,    Thank you for referring your patient, Steven Patricio, to the [facility]. Please see a copy of my visit note below.    [facility] - PRIMARY CARE SKIN    CC: small bumps on face  SUBJECTIVE:   Steven Patricio is a(n) 38 year old male who presents to clinic today because of bumps on the face.follow-up of facial spots. A spot on the left temple was tender a couple of days ago.    Previous therapies for facial skin symptoms include: doxycycline 50 mg BID, ketoconazole 2% cream, tacrolimus 0.1% ointment    Personal Medical History  Skin Cancer: NO  Eczema Psoriasis Autoimmune   NO NO NO   Other: seborrheic dermatitis vs blepharitis vs rosacea ?with ocular component.    Family Medical History  Skin Cancer: YES - skin cancer in grandfather    Sun Exposure History  Previous history of significant sun exposure:  Blistering sunburns : YES  Tanning beds : NO.  Sunscreen Use : YES.  Sun-protective clothing use : No  Wide-brimmed hats : No  Sunglasses : Yes  Seeks shade : No     Occupation : logistics (indoor).    Refer to electronic medical record (EMR) for past medical history and medications.    INTEGUMENTARY/SKIN: POSITIVE for lumps or bumps  ROS: 14 point review of systems was negative except the symptoms listed above in the HPI.    This document serves as a record of the services and decisions personally performed and made by Stephenie Galicia MD and was created by Giovani Hutton, a trained medical scribe, based on personal observations and provider statements to the medical scribe.  January 7, 2019 3:46 PM   Giovani Hutton    OBJECTIVE:   GENERAL: healthy, alert and no distress.  SKIN: Moser Skin Type - II.  Face examined. The dermatoscope was used to help evaluate pigmented lesions.  Skin Pertinent Findings:  Left temple: raised, flesh-colored, lesion with central depression consistent with sebaceous  hyperplasia.    Diagnostic Test Results:  none     ASSESSMENT:     Encounter Diagnosis   Name Primary?     Sebaceous hyperplasia of face Yes       PLAN:   Patient Instructions   FUTURE APPOINTMENTS  Follow up as needed if lesions develop increasing tenderness, size, bleeding, discharge, or infection.        TT: 20 minutes.  CT: 15 minutes.    The information in this document, created by the medical scribe for me, accurately reflects the services I personally performed and the decisions made by me. I have reviewed and approved this document for accuracy prior to leaving the patient care area.  January 7, 2019 3:46 PM  Stephenie Galicia MD  [facility] [place]    Again, thank you for allowing me to participate in the care of your patient.        Sincerely,        Stephenie Galicia MD

## 2019-01-07 NOTE — PROGRESS NOTES
[facility] - PRIMARY CARE SKIN    CC: small bumps on face  SUBJECTIVE:   Steven Patricio is a(n) 38 year old male who presents to clinic today because of bumps on the face.follow-up of facial spots. A spot on the left temple was tender a couple of days ago.    Previous therapies for facial skin symptoms include: doxycycline 50 mg BID, ketoconazole 2% cream, tacrolimus 0.1% ointment    Personal Medical History  Skin Cancer: NO  Eczema Psoriasis Autoimmune   NO NO NO   Other: seborrheic dermatitis vs blepharitis vs rosacea ?with ocular component.    Family Medical History  Skin Cancer: YES - skin cancer in grandfather    Sun Exposure History  Previous history of significant sun exposure:  Blistering sunburns : YES  Tanning beds : NO.  Sunscreen Use : YES.  Sun-protective clothing use : No  Wide-brimmed hats : No  Sunglasses : Yes  Seeks shade : No     Occupation : logistics (indoor).    Refer to electronic medical record (EMR) for past medical history and medications.    INTEGUMENTARY/SKIN: POSITIVE for lumps or bumps  ROS: 14 point review of systems was negative except the symptoms listed above in the HPI.    This document serves as a record of the services and decisions personally performed and made by Stephenie Galicia MD and was created by Giovani Hutton, a trained medical scribe, based on personal observations and provider statements to the medical scribe.  January 7, 2019 3:46 PM   Giovani Hutton    OBJECTIVE:   GENERAL: healthy, alert and no distress.  SKIN: Moser Skin Type - II.  Face examined. The dermatoscope was used to help evaluate pigmented lesions.  Skin Pertinent Findings:  Left temple: raised, flesh-colored, lesion with central depression consistent with sebaceous hyperplasia.    Diagnostic Test Results:  none     ASSESSMENT:     Encounter Diagnosis   Name Primary?     Sebaceous hyperplasia of face Yes       PLAN:   Patient Instructions   FUTURE APPOINTMENTS  Follow up as needed if lesions develop  increasing tenderness, size, bleeding, discharge, or infection.        TT: 20 minutes.  CT: 15 minutes.    The information in this document, created by the medical scribe for me, accurately reflects the services I personally performed and the decisions made by me. I have reviewed and approved this document for accuracy prior to leaving the patient care area.  January 7, 2019 3:46 PM  Stephenie Galicia MD  [facility] [place]

## 2020-03-11 ENCOUNTER — HEALTH MAINTENANCE LETTER (OUTPATIENT)
Age: 40
End: 2020-03-11

## 2020-12-27 ENCOUNTER — HEALTH MAINTENANCE LETTER (OUTPATIENT)
Age: 40
End: 2020-12-27

## 2021-04-25 ENCOUNTER — HEALTH MAINTENANCE LETTER (OUTPATIENT)
Age: 41
End: 2021-04-25

## 2021-10-09 ENCOUNTER — HEALTH MAINTENANCE LETTER (OUTPATIENT)
Age: 41
End: 2021-10-09

## 2022-05-21 ENCOUNTER — HEALTH MAINTENANCE LETTER (OUTPATIENT)
Age: 42
End: 2022-05-21

## 2022-09-17 ENCOUNTER — HEALTH MAINTENANCE LETTER (OUTPATIENT)
Age: 42
End: 2022-09-17

## 2023-06-04 ENCOUNTER — HEALTH MAINTENANCE LETTER (OUTPATIENT)
Age: 43
End: 2023-06-04